# Patient Record
(demographics unavailable — no encounter records)

---

## 2025-04-02 NOTE — ASSESSMENT
[FreeTextEntry1] : Hematuria, chronic, etiology not clear  Patient is without signficant proteinuria Her cystoscopy has been negative in the past  Us renal without any renal stones Renal function is currently normal   HTN BP is fluctuating   10 cm simple right renal cyst, benign   Obesity

## 2025-04-02 NOTE — PLAN
[TextEntry] : Repeat urine studies  CHeck UPCR Patient could have IgA nephropathy, however, no role of renal biopsy at this time  If continues to have persistent hematuria, will check GN workup  Follow up  in 3 months  Discussed with the patient

## 2025-04-02 NOTE — HISTORY OF PRESENT ILLNESS
[FreeTextEntry1] : 65 yo F with history of Rheumatoid arthritis, HTN, HLD was referred for evaluation of worsening renal function and hematuria.  She currently denies any complaints.  She denies any NSAID use.  No history of frequent UTIs or renal stones.  Her K was increased at some point, and her Cardiologist Dr. Landin decreased her ramipril from 10 to 2.5, and her hyperK resolved.   3/31/25 Seen today  Denies any complaints  Her BP is usually high in doctor's offices She had nifedipine increased last time, but she developed dizziness, and the dose was decreased by Dr. Landin

## 2025-04-02 NOTE — RESULTS/DATA
[TextEntry] : 2/4/25 BUN.Cr 22/0.82 eGFR 79 UA with trace protein and blood, no RBCs on microscopy 7/24/24 BUN/Cr 13/0.86 Ca 9.5 K 4.5 HCO3 27 eGFR 75 7/15/2024 BUN/Cr 19/1.16 Ca 10.2 K 5.4  UA from 7/18/2024 with moderate blood 1+ bacteria 11-20 epithelial cells  Urine cytology negative for malignant cells   Imaging  Us renal from 11/20/24 Rt kidney 8.7x5.7x6.5 cm Lt kidney 9.3x4.5x7.5 cm There is a 10 cm simple exophytic cyst in the lower pole of the left kidney which is unchanged   Us renal and bladder from 4/9/2024  Rt kidney 8.6 cmx4.7x5.4  Lt kidney 9.5cmx4.8cmx6.3 cm There evidence of a large exophytic simple lower pole left renal cyst measuring 9.8x8.9x10.2cm

## 2025-06-24 NOTE — RESULTS/DATA
[TextEntry] : 6/18/2025 BUN?Cr 16/0.72 eGFR 92 Na 137 HCO3 25 UA with 2+ occult blood, and no RBCs UPCR 0.132 g/g 2/4/25 BUN.Cr 22/0.82 eGFR 79 UA with trace protein and blood, no RBCs on microscopy 7/24/24 BUN/Cr 13/0.86 Ca 9.5 K 4.5 HCO3 27 eGFR 75 7/15/2024 BUN/Cr 19/1.16 Ca 10.2 K 5.4  UA from 7/18/2024 with moderate blood 1+ bacteria 11-20 epithelial cells  Urine cytology negative for malignant cells   Imaging  Us renal from 11/20/24 Rt kidney 8.7x5.7x6.5 cm Lt kidney 9.3x4.5x7.5 cm There is a 10 cm simple exophytic cyst in the lower pole of the left kidney which is unchanged   Us renal and bladder from 4/9/2024  Rt kidney 8.6 cmx4.7x5.4  Lt kidney 9.5cmx4.8cmx6.3 cm There evidence of a large exophytic simple lower pole left renal cyst measuring 9.8x8.9x10.2cm

## 2025-06-24 NOTE — HISTORY OF PRESENT ILLNESS
[FreeTextEntry1] : 65 yo F with history of Rheumatoid arthritis, HTN, HLD was referred for evaluation of worsening renal function and hematuria.  She currently denies any complaints.  She denies any NSAID use.  No history of frequent UTIs or renal stones.  Her K was increased at some point, and her Cardiologist Dr. Landin decreased her ramipril from 10 to 2.5, and her hyperK resolved.   6/24/2025 Seen today  No gross hematuria   3/31/25 Seen today  Denies any complaints  Her BP is usually high in doctor's offices She had nifedipine increased last time, but she developed dizziness, and the dose was decreased by Dr. Landin

## 2025-06-24 NOTE — PLAN
[TextEntry] : Repeat urine studies  Patient could have IgA nephropathy, however, no role of renal biopsy at this time  Check CT abdomen/pelvis  Repeat labs and follow up follow up in 8 months  Discussed with the patient